# Patient Record
Sex: MALE | Race: ASIAN | NOT HISPANIC OR LATINO | URBAN - METROPOLITAN AREA
[De-identification: names, ages, dates, MRNs, and addresses within clinical notes are randomized per-mention and may not be internally consistent; named-entity substitution may affect disease eponyms.]

---

## 2022-10-03 ENCOUNTER — EMERGENCY (EMERGENCY)
Facility: HOSPITAL | Age: 42
LOS: 1 days | Discharge: HOME | End: 2022-10-03
Admitting: EMERGENCY MEDICINE

## 2022-10-03 VITALS
HEART RATE: 78 BPM | TEMPERATURE: 98 F | RESPIRATION RATE: 16 BRPM | OXYGEN SATURATION: 100 % | DIASTOLIC BLOOD PRESSURE: 78 MMHG | SYSTOLIC BLOOD PRESSURE: 110 MMHG

## 2022-10-03 VITALS
SYSTOLIC BLOOD PRESSURE: 118 MMHG | HEART RATE: 64 BPM | TEMPERATURE: 98 F | OXYGEN SATURATION: 100 % | RESPIRATION RATE: 16 BRPM | DIASTOLIC BLOOD PRESSURE: 68 MMHG

## 2022-10-03 PROCEDURE — 99284 EMERGENCY DEPT VISIT MOD MDM: CPT

## 2022-10-03 PROCEDURE — 73590 X-RAY EXAM OF LOWER LEG: CPT | Mod: 26,LT

## 2022-10-03 PROCEDURE — 73610 X-RAY EXAM OF ANKLE: CPT | Mod: 26,LT

## 2022-10-03 RX ORDER — IBUPROFEN 200 MG
1 TABLET ORAL
Qty: 28 | Refills: 0
Start: 2022-10-03 | End: 2022-10-09

## 2022-10-03 NOTE — ED PROVIDER NOTE - PHYSICAL EXAMINATION
CONSTITUTIONAL: Well-appearing; well-nourished; in no apparent distress;  HEAD: Normocephalic, atraumatic;  EYES: conjunctiva and sclera WNL;  ENT: normal nose;   NECK/LYMPH: Supple; non-tender;  CARD: Normal S1, S2; no murmurs, rubs, or gallops noted  RESP: Normal chest excursion with respiration; breath sounds clear and equal bilaterally; no wheezes, rhonchi, or rales noted  EXT/MS: mild swelling noted to L ankle. + lateral malleolar ttp. negative medial malleolar ttp, + ATFL ttp. no ttp overlying dorsum of L foot. FROM w/ flexion/extension, inversion/eversion of L ankle. negative proximal tib fib ttp. Denies any knee pain. moves all extremities; distal pulses are normal, no pedal edema  SKIN: Normal for age and race; warm; dry; good turgor; no apparent lesions or exudate noted  NEURO: Awake, alert, oriented x 3, no gross deficits, CN II-XII grossly intact, no motor or sensory deficit noted  PSYCH: Normal mood; appropriate affect

## 2022-10-03 NOTE — ED PROVIDER NOTE - PROGRESS NOTE DETAILS
case d/w ortho recommending xray ankle stress view to r/o syndesmotic injury, as well as trilam splint. case d/w ortho, trilam splint placed, requesting post splint xrays post splint xray stable.  pt will f/u with ortho Dr. Crane in 1 week.  Return precautions discussed for numbness, tingling, hot/cold sensation, worsening pain.

## 2022-10-03 NOTE — ED PROVIDER NOTE - CARE PROVIDER_API CALL
Yinka Crane)  Orthopedics  [address] [place] [address]  Phone: [phone]  Fax: [phone]  Follow Up Time:

## 2022-10-03 NOTE — ED PROVIDER NOTE - PATIENT PORTAL LINK FT
You can access the FollowMyHealth Patient Portal offered by [facility] by registering at the following website: [email or web address]. By joining [facility]’s FollowMyHealth portal, you will also be able to view your health information using other applications (apps) compatible with our system.

## 2022-10-03 NOTE — ED ADULT NURSE NOTE - OBJECTIVE STATEMENT
patient arrives to the ER A&Ox4, c/o L ankle pain s/p mechanical fall on grass. patient states he got an XR at urgent care today and was told he had a fracture. patient was told to follow up with an ortho doctor today and could not find one. patient came to the ER in hopes to see an ortho specialist. patients L leg in splint. patient is otherwise well appearing, states he did not receive any medication for pain. no acute distress noted. patient pending orders at this time. no other injuries noted. denies any other trauma.

## 2022-10-03 NOTE — ED ADULT TRIAGE NOTE - CHIEF COMPLAINT QUOTE
c/o left ankle pain, onset 1 week ago reports was diagnosed with ankle fracture by urgent care, states unable to make an appointment to ortho MD. hx of pre-diabetes.

## 2022-10-03 NOTE — CONSULT NOTE ADULT - SUBJECTIVE AND OBJECTIVE BOX
Orthopedic Surgery Consult Note    42yMale p/w R ankle pain s/p mechanical fall. Pt reports he was holding his son and tripped on sidewalk, twisting ankle. Pt had controlled fall to ground. Denies headstrike/LOC. Able to ambulate with mild pain afterward. Denies numbness/tingling in the feet/toes. No other bone or joint complaints.               Vital Signs Last 24 Hrs  T(C): 36.7 (10-03-22 @ 13:19), Max: 36.7 (10-03-22 @ 13:19)  T(F): 98 (10-03-22 @ 13:19), Max: 98 (10-03-22 @ 13:19)  HR: 64 (10-03-22 @ 13:19) (64 - 64)  BP: 118/68 (10-03-22 @ 13:19) (118/68 - 118/68)  BP(mean): --  RR: 16 (10-03-22 @ 13:19) (16 - 16)  SpO2: 100% (10-03-22 @ 13:19) (100% - 100%)    Physical Exam  Gen: NAD  RLE: Skin intact, +skin tenting medially +TTP lateral malleolus  limited ankle ROM due to pain  motor intact distally  SILT s/s/sp/dp/t  2+ DP    Imaging:  XR showing R ankle fracture    Procedure: Closed reduction performed followed by placement of a well padded trilam splint. Patient tolerated the procedure well. Post procedure imaging obtained and showed improved alignment. Post procedure the patient was NV intact.    A/P: 42yMale with R ankle fracture s/p closed reduction and splinting. FU post splinting xrays    - Pain control  - NWB on affected extremity in splint  - Keep splint clean, dry and intact until follow up  Splint precautions:  Keep Splint dry  Elevate extremity, can try and ice through the Splint   Do not stick anything into the Splint   - Cane/crutches/walker as needed  - Ice/elevation  - Follow up with Dr. Crane in 1 week, call [phone]  for appointment

## 2022-10-03 NOTE — ED PROVIDER NOTE - OBJECTIVE STATEMENT
42yoM no significant PMH presenting to ED from urgent care for ankle fracture. pt was walking on sidewalk to bring his child to school while carrying them and had rolled his ankle on edge of side walk into the soil. Denies fall, head trauma or LOC. ambulatory after incident. iced ankle after incident, however had increasing pain / swelling, went to urgent care [facility], was found to have distal lateral malleolar fracture with minimal displacement, placed in short leg cast / hard sole shoe. Pt denies numbness, tingling, abd pain, fevers, weakness, dizziness, chest pain, shortness of breaht, neck pain, back pain.

## 2022-10-03 NOTE — ED PROVIDER NOTE - NSFOLLOWUPINSTRUCTIONS_ED_ALL_ED_FT
Ankle Fracture    WHAT YOU NEED TO KNOW:    An ankle fracture is a break in 1 or more of the bones in your ankle.  Foot Anatomy    DISCHARGE INSTRUCTIONS:    Call your local emergency number (911 in the [place]) for any of the following:    You feel lightheaded, short of breath, and have chest pain.    You cough up blood.  Return to the emergency department if:    Your leg feels warm, tender, and painful. It may look swollen and red.    Blood soaks through your bandage.    You have severe pain in your ankle.    Your cast feels too tight.    Your foot or toes are cold or numb.    Your foot or toenails turn blue or gray.  Call your doctor if:    Your splint feels too tight.    Your swelling has increased or returned.    You have a fever.    Your pain does not go away, even after treatment.    You have questions or concerns about your condition or care.  Medicines: You may need any of the following:    Acetaminophen decreases pain and fever. It is available without a doctor's order. Ask how much to take and how often to take it. Follow directions. Read the labels of all other medicines you are using to see if they also contain acetaminophen, or ask your doctor or pharmacist. Acetaminophen can cause liver damage if not taken correctly. Do not use more than 4 grams (4,000 milligrams) total of acetaminophen in one day.    NSAIDs, such as ibuprofen, help decrease swelling, pain, and fever. This medicine is available with or without a doctor's order. NSAIDs can cause stomach bleeding or kidney problems in certain people. If you take blood thinner medicine, always ask your healthcare provider if NSAIDs are safe for you. Always read the medicine label and follow directions.    Prescription pain medicine may be given. Ask your healthcare provider how to take this medicine safely. Some prescription pain medicines contain acetaminophen. Do not take other medicines that contain acetaminophen without talking to your healthcare provider. Too much acetaminophen may cause liver damage. Prescription pain medicine may cause constipation. Ask your healthcare provider how to prevent or treat constipation.    Take your medicine as directed. Contact your healthcare provider if you think your medicine is not helping or if you have side effects. Tell him or her if you are allergic to any medicine. Keep a list of the medicines, vitamins, and herbs you take. Include the amounts, and when and why you take them. Bring the list or the pill bottles to follow-up visits. Carry your medicine list with you in case of an emergency.  Self-care:  CLIFFI.C.E.    Rest your ankle so that it can heal. Return to normal activities as directed.    Apply ice on your ankle for 15 to 20 minutes every hour or as directed. Use an ice pack, or put crushed ice in a plastic bag. Cover it with a towel. Ice helps prevent tissue damage and decreases swelling and pain.    Use a support device, such as a brace, cast, or splint to limit your movement and protect your ankle. You may need to use crutches to protect your ankle and decrease your pain as you move around. Do not remove your device and do not put weight on your injured ankle.    Elevate your ankle above the level of your heart as often as you can. This will help decrease swelling and pain. Prop your ankle on pillows or blankets to keep it elevated comfortably.  Splint and cast care: Cover the splint or cast before you bathe so it does not get wet. Tape 2 plastic trash bags to your skin above the cast. Try to keep your ankle out of the water as much as possible.    Follow up with your doctor in 1 to 2 days, or as directed: Your fracture may need to be reduced (bones pushed back into place) or you may need surgery. Write down your questions so you remember to ask them during your visits. Follow up with Dr. Crane in 1 week for follow up.  Return to ED for numbness, tingling, weakness, worsening pain, chest pain or shortness of breath.    Ankle Fracture    WHAT YOU NEED TO KNOW:    An ankle fracture is a break in 1 or more of the bones in your ankle.  Foot Anatomy    DISCHARGE INSTRUCTIONS:    Call your local emergency number (911 in the [place]) for any of the following:    You feel lightheaded, short of breath, and have chest pain.    You cough up blood.  Return to the emergency department if:    Your leg feels warm, tender, and painful. It may look swollen and red.    Blood soaks through your bandage.    You have severe pain in your ankle.    Your cast feels too tight.    Your foot or toes are cold or numb.    Your foot or toenails turn blue or gray.  Call your doctor if:    Your splint feels too tight.    Your swelling has increased or returned.    You have a fever.    Your pain does not go away, even after treatment.    You have questions or concerns about your condition or care.  Medicines: You may need any of the following:    Acetaminophen decreases pain and fever. It is available without a doctor's order. Ask how much to take and how often to take it. Follow directions. Read the labels of all other medicines you are using to see if they also contain acetaminophen, or ask your doctor or pharmacist. Acetaminophen can cause liver damage if not taken correctly. Do not use more than 4 grams (4,000 milligrams) total of acetaminophen in one day.    NSAIDs, such as ibuprofen, help decrease swelling, pain, and fever. This medicine is available with or without a doctor's order. NSAIDs can cause stomach bleeding or kidney problems in certain people. If you take blood thinner medicine, always ask your healthcare provider if NSAIDs are safe for you. Always read the medicine label and follow directions.    Prescription pain medicine may be given. Ask your healthcare provider how to take this medicine safely. Some prescription pain medicines contain acetaminophen. Do not take other medicines that contain acetaminophen without talking to your healthcare provider. Too much acetaminophen may cause liver damage. Prescription pain medicine may cause constipation. Ask your healthcare provider how to prevent or treat constipation.    Take your medicine as directed. Contact your healthcare provider if you think your medicine is not helping or if you have side effects. Tell him or her if you are allergic to any medicine. Keep a list of the medicines, vitamins, and herbs you take. Include the amounts, and when and why you take them. Bring the list or the pill bottles to follow-up visits. Carry your medicine list with you in case of an emergency.  Self-care:  R.I.C.E.    Rest your ankle so that it can heal. Return to normal activities as directed.    Apply ice on your ankle for 15 to 20 minutes every hour or as directed. Use an ice pack, or put crushed ice in a plastic bag. Cover it with a towel. Ice helps prevent tissue damage and decreases swelling and pain.    Use a support device, such as a brace, cast, or splint to limit your movement and protect your ankle. You may need to use crutches to protect your ankle and decrease your pain as you move around. Do not remove your device and do not put weight on your injured ankle.    Elevate your ankle above the level of your heart as often as you can. This will help decrease swelling and pain. Prop your ankle on pillows or blankets to keep it elevated comfortably.  Splint and cast care: Cover the splint or cast before you bathe so it does not get wet. Tape 2 plastic trash bags to your skin above the cast. Try to keep your ankle out of the water as much as possible.    Follow up with your doctor in 1 to 2 days, or as directed: Your fracture may need to be reduced (bones pushed back into place) or you may need surgery. Write down your questions so you remember to ask them during your visits.

## 2022-10-03 NOTE — ED PROVIDER NOTE - CLINICAL SUMMARY MEDICAL DECISION MAKING FREE TEXT BOX
42yoM with PMH preDM seen at [facility], dx with distal lateral malleolar fx L ankle, placed in short leg splint/hard sole shoe, sent in for ortho eval   + left distal lateral malleolar ttp     PLAN:  case d/w ortho recommending xray tib fib as well to r/o spiral fracture and will evaluate.

## 2022-10-04 PROBLEM — Z78.9 OTHER SPECIFIED HEALTH STATUS: Chronic | Status: ACTIVE | Noted: 2022-10-03

## 2022-10-07 PROBLEM — Z00.00 ENCOUNTER FOR PREVENTIVE HEALTH EXAMINATION: Status: ACTIVE | Noted: 2022-10-07

## 2022-10-11 ENCOUNTER — APPOINTMENT (OUTPATIENT)
Dept: ORTHOPEDIC SURGERY | Facility: CLINIC | Age: 42
End: 2022-10-11

## 2022-10-11 VITALS
DIASTOLIC BLOOD PRESSURE: 72 MMHG | BODY MASS INDEX: 23.32 KG/M2 | WEIGHT: 140 LBS | SYSTOLIC BLOOD PRESSURE: 108 MMHG | HEIGHT: 65 IN

## 2022-10-11 PROCEDURE — 27786 TREATMENT OF ANKLE FRACTURE: CPT | Mod: LT

## 2022-10-11 PROCEDURE — 99203 OFFICE O/P NEW LOW 30 MIN: CPT | Mod: 57

## 2022-10-11 NOTE — REVIEW OF SYSTEMS
[Joint Pain] : no joint pain [Joint Stiffness] : no joint stiffness [Joint Swelling] : no joint swelling [Fever] : no fever [Chills] : no chills [FreeTextEntry5] : No heart attacks [FreeTextEntry7] : No stomach issues or ulcers [de-identified] : No fevers or chills [de-identified] : No numbness or tingling

## 2022-10-11 NOTE — PHYSICAL EXAM
[de-identified] : Constitutional:  42 year old male, alert and oriented, cooperative, in no acute distress.\par \par HEENT \par NC/AT.  Appearance: symmetric\par \par Neck/Back\par Straight without deformity or instability.  Good ROM.\par \par Chest/Respiratory \par Respiratory effort: no intercostal retractions or use of accessory muscles. Nonlabored Breathing\par \par Mental Status: \par Judgment, insight: intact\par Orientation: oriented to time, place, and person\par \par Neurological:\par Sensory and Motor are grossly intact throughout\par \par Left Ankle : Splint Removed\par \par Inspection:\par     Skin intact, no rashes or lesions\par     Mild lateral ankle swelling just distal to fibula. Healing bruising/ecchymoses over lateral ankle\par     Non-tender to palpation over AITFL, CFL, PITFL and Deltoid Ligaments\par     Non-tender over the fibula, lateral malleolus and medial malleolus. No significant tenderness over distal fibula at fracture site\par     Non-tender of the 5th metatarsal, foot and midfoot\par \par Range of Motion:\par 	Dorsiflexion - 30 degrees\par 	Plantarflexion - 45 degrees\par \par Stability:\par      Demonstrates no instability\par      Negative Anterior or Posterior drawer. \par \par Neurologic Exam\par     Motor intact including 5/5 Extensor Hallucis Longus, 5/5 Flexor Hallucis Longus, 5/5 Tibialis Anterior and 5/5 Gastrocnemius\par     Sensation Intact to Light Touch including Saphenous, Sural, Superficial Peroneal, Deep Peroneal, Tibial nerve distributions\par \par Vascular Exam\par     Foot is warm and well perfused with 2+ Dorsalis Pedis Pulse \par \par No pain with range of motion of the bilateral hips or knees. No lumbar paraspinal muscle tenderness.  [de-identified] : XRay:  XRays of the Left Ankle (3 Views) taken 10/3/22 at [facility] were reviewed with the patient. There is a minimally displaced, transverse left distal fibula fracture ([place] A). (my personal interpretation).\par  \par XRay: 10/3/22\par \par IMPRESSION:\par There is an acute, mildly displaced fracture of the tip of the distal fibula.\par Splint material is present about the calf, which is removed on subsequent ankle radiographs.\par The ankle mortise is intact. There is no widening of the medial clear space.

## 2022-10-11 NOTE — DISCUSSION/SUMMARY
[de-identified] : Mr. Ludwig is a 42 year old male who presented to the office for evaluation of his left ankle fracture.  Patient experienced an inversion injury of his left ankle on 10/3/2022 and presented to the [facility] emergency department for evaluation.  XRays showed a left transverse distal fibula fracture ([place] A) with a stable ankle mortise and no medial clear space widening on stress examination.  Patient was placed into a splint and instructed to follow-up in the office.  Splint was removed and examination showed a stable ankle with minimal tenderness.  Discussed the natural history and management of ankle fractures.  Discussed the nonoperative management of ankle fractures, including immobilization, rest, ice, and elevation.  Patient was placed into a walker boot.  He will be weightbearing as tolerated in the walker boot.  Patient was given driving precautions while using the walker boot.  Patient asked for his wife's [facility] paperwork to be filled out so that she may care for their son while patient is recovering from his injury.  Patient's paperwork was filled out stating the nature of the injury, weightbearing status, and anticipated follow-up.  Paperwork was scanned into his chart.   He will follow-up in 3 to 4 weeks for reevaluation and management.  Patient understanding and agreement with the plan.  All questions answered.\par \par Plan:\par - Weight bearing as tolerated in Walker Boot\par - Follow up in 3-4 weeks for re-evaluation and management

## 2022-10-11 NOTE — HISTORY OF PRESENT ILLNESS
[de-identified] : Mr. Ludwig is a 42 year old male who presents to the office for evaluation of his left ankle fracture.  On 10/3/2022, patient was walking and holding his son when he tried to avoid another person and began to walk on uneven ground.  Patient lost his balance and twisted his left ankle.  He felt immediate pain but was able to hop on his leg.  He presented to the [facility] emergency department for further evaluation.  XRays showed a left transverse distal fibula fracture ([place] A) and stress test was negative.  Patient was placed into a splint, made weightbearing as tolerated and instructed to follow-up in the office.  Patient has been able to weight-bear on the left leg without significant pain.  He does not have significant left ankle pain at this time.  No numbness or tingling.  No knee or proximal leg pain.  No hip or groin pain.  No fevers or chills.

## 2022-10-23 ENCOUNTER — NON-APPOINTMENT (OUTPATIENT)
Age: 42
End: 2022-10-23

## 2022-10-25 ENCOUNTER — NON-APPOINTMENT (OUTPATIENT)
Age: 42
End: 2022-10-25

## 2022-11-01 ENCOUNTER — APPOINTMENT (OUTPATIENT)
Dept: ORTHOPEDIC SURGERY | Facility: CLINIC | Age: 42
End: 2022-11-01

## 2022-11-01 VITALS
SYSTOLIC BLOOD PRESSURE: 103 MMHG | DIASTOLIC BLOOD PRESSURE: 69 MMHG | HEART RATE: 65 BPM | HEIGHT: 65 IN | OXYGEN SATURATION: 97 % | BODY MASS INDEX: 23.32 KG/M2 | WEIGHT: 140 LBS

## 2022-11-01 PROCEDURE — 99024 POSTOP FOLLOW-UP VISIT: CPT

## 2022-11-01 PROCEDURE — 73610 X-RAY EXAM OF ANKLE: CPT | Mod: LT

## 2022-11-02 NOTE — REVIEW OF SYSTEMS
[Joint Pain] : no joint pain [Joint Stiffness] : no joint stiffness [Joint Swelling] : no joint swelling [Fever] : no fever [Chills] : no chills [de-identified] : N [de-identified] : No numbness/tingling

## 2022-11-02 NOTE — HISTORY OF PRESENT ILLNESS
[de-identified] : Mr. Ludwig is a 42-year-old male presenting the office for follow-up of his left ankle distal fibula ([place] A) fracture.  Patient experienced an inversion ankle injury on 10/3/2022.  Patient then presented to the [facility] emergency department for evaluation, where XRays showed a left ankle distal fibula ([place] A) fracture.  Stress test of the ankle did not show any medial clear space widening.  He was placed into a splint.  Patient was then seen in the office on 10/11/2022 and placed into a walker boot.  Patient has been walking well in the walker boot.  He states that his left ankle pain is improving.  He does not have significant swelling at this time.  Patient does notice occasional ankle pain with certain positions or if he moves quickly.  He is able to bear weight without issues.  No subsequent trauma.  No fevers or chills.  No numbness or tingling.  No knee or hip pain.

## 2022-11-02 NOTE — DISCUSSION/SUMMARY
[de-identified] : Mr. Ludwig is a 42-year-old male who presented to the office for follow-up of his left ankle distal fibula ([place] A) fracture.  Patient initially sustained a fracture on 10/3/2022.  He was seen in the office on 10/11/2022 and placed into a walker boot.  Patient has been doing well in the walker boot and his pain is improved.  He is able to bear weight without issues. XRays showed no changes in patient's medial clear space or alignment following weightbearing for the last few weeks.  Examination showed improved swelling and range of motion of the left ankle, with some mild tenderness over the anterior lateral ankle capsule.  Discussed with the patient the management of left ankle sprains and ankle fractures.  Discussed that the recovery process can take 2 weeks to 2 months.  Discussed that management would consist of continued rest, bracing, and physical therapy.  Patient was given a lace up ankle brace for his left ankle.  He was instructed to begin gradual range of motion and strengthening exercises for his left ankle.  Patient will follow-up in 3 weeks for reevaluation and management, and possible physical therapy referral.  Patient understanding and in agreement with the plan.  All questions answered.\par \par Plan:\par - Weight bearing as tolerated for left ankle\par - Begin gradual strengthening and range of motion exercises\par - Lace Up Ankle Brace given\par - Follow up in 3 weeks for reevaluation and management, possible physical therapy referral

## 2022-11-22 ENCOUNTER — APPOINTMENT (OUTPATIENT)
Dept: ORTHOPEDIC SURGERY | Facility: CLINIC | Age: 42
End: 2022-11-22

## 2022-11-22 VITALS
HEIGHT: 65 IN | DIASTOLIC BLOOD PRESSURE: 64 MMHG | SYSTOLIC BLOOD PRESSURE: 109 MMHG | WEIGHT: 140 LBS | BODY MASS INDEX: 23.32 KG/M2

## 2022-11-22 DIAGNOSIS — S82.62XA DISPLACED FRACTURE OF LATERAL MALLEOLUS OF LEFT FIBULA, INITIAL ENCOUNTER FOR CLOSED FRACTURE: ICD-10-CM

## 2022-11-22 PROCEDURE — 99024 POSTOP FOLLOW-UP VISIT: CPT

## 2022-11-22 PROCEDURE — 73610 X-RAY EXAM OF ANKLE: CPT | Mod: LT

## 2022-11-22 NOTE — DISCUSSION/SUMMARY
[de-identified] : Mr. Ludwig is a 42-year-old male presents the office for follow-up of his left ankle distal fibula fracture.  Patient had a twisting injury of the left ankle on 10/3/2022.  He was found to have a left distal fibula fracture.  Patient has been treated in a walker boot.  He is currently doing well with significant improvement in the pain. XRays showed a healing left distal fibula fracture.  Examination showed no significant pain over the left ankle.  Discussed with the patient the examination and imaging findings.  Discussed with the patient that he no longer has ankle pain and is currently doing well.  Discussed that he can start physical therapy for left ankle to improve range of motion and strengthening.  Discussed that patient no longer needs the walker boot and can walk with his lace up ankle brace given at the last appointment.  Patient was given a referral to physical therapy.  Patient will follow-up in 2 to 3 months for reevaluation and management.  Patient understanding and in agreement with plan.  All questions answered.\par \par Plan:\par - Left Lower Extremity: Weightbearing as tolerated\par - Lace up ankle brace\par - Physical Therapy for left ankle distal fibula fracture/ankle sprain\par - Follow up in 2 to 3 months for reevaluation and management

## 2022-11-22 NOTE — HISTORY OF PRESENT ILLNESS
[de-identified] : Ms. Rodriguez is a 42-year-old male who presents the office for follow-up of his left distal fibula fracture.  Patient experienced a twisting injury of the left ankle on 10/3/2022.  Patient has been seen in the office and diagnosed with a left distal fibula fracture.  He was treated with immobilization, and rest, ice, elevation.  Patient states that the pain is improving.  He continues to use a walker boot outside and a lace up ankle brace while inside.  He experiences some occasional pain with certain movements, but his pain is largely resolved.  He has no pain with lifting objects.  Patient continues to have some fear over walking.  No recent trauma.  No fevers or chills.  No hip or groin pain.

## 2022-11-22 NOTE — REVIEW OF SYSTEMS
[Joint Pain] : joint pain [Joint Stiffness] : no joint stiffness [Joint Swelling] : no joint swelling [Fever] : no fever [Chills] : no chills [de-identified] : No numbness or tingling [de-identified] : No fevers or chills

## 2022-11-22 NOTE — PHYSICAL EXAM
[de-identified] : Constitutional: 42 year old male, alert and oriented, cooperative, in no acute distress.\par \par HEENT \par NC/AT.  Appearance: symmetric\par \par Neck/Back\par Straight without deformity or instability.  Good ROM.\par \par Chest/Respiratory \par Respiratory effort: no intercostal retractions or use of accessory muscles. Nonlabored Breathing\par \par Skin \par On inspection, warm and dry without rashes or lesions.\par \par Mental Status: \par Judgment, insight: intact\par Orientation: oriented to time, place, and person\par \par Neurological:\par Sensory and Motor are grossly intact throughout\par \par Left  Ankle \par \par Inspection:\par     Skin intact, no rashes or lesions\par     No significant swelling\par     Non-tender to palpation over AITFL, CFL, PITFL and Deltoid Ligaments\par     Non-tender over the fibula, lateral malleolus and medial malleolus\par     Non-tender of the 5th metatarsal, foot and midfoot\par \par Range of Motion:\par 	Dorsiflexion - 30 degrees\par 	Plantarflexion - 40 degrees\par \par Stability:\par      Demonstrates no instability\par      Negative Anterior or Posterior drawer. \par \par Neurologic Exam\par     Motor intact including 5/5 Extensor Hallucis Longus, 5/5 Flexor Hallucis Longus, 5/5 Tibialis Anterior and 5/5 Gastrocnemius\par     Sensation Intact to Light Touch including Saphenous, Sural, Superficial Peroneal, Deep Peroneal, Tibial nerve distributions\par \par Vascular Exam\par     Foot is warm and well perfused with 2+ Dorsalis Pedis Pulse \par \par No pain with range of motion of the bilateral hips or knees. No lumbar paraspinal muscle tenderness.  [de-identified] : XRay: XRays of the Left Ankle (3 Views) taken in the office today and reviewed with the patient.. There is a healing minimally displaced, transverse left distal fibula fracture ([place]).  No significant displacement of the fracture compared to prior imaging.  (my personal interpretation).\par